# Patient Record
Sex: MALE | Employment: UNEMPLOYED | URBAN - METROPOLITAN AREA
[De-identification: names, ages, dates, MRNs, and addresses within clinical notes are randomized per-mention and may not be internally consistent; named-entity substitution may affect disease eponyms.]

---

## 2018-08-01 DIAGNOSIS — R06.02 SOB (SHORTNESS OF BREATH): Primary | ICD-10-CM

## 2018-08-31 ENCOUNTER — TELEPHONE (OUTPATIENT)
Dept: PULMONOLOGY | Facility: CLINIC | Age: 28
End: 2018-08-31

## 2018-08-31 DIAGNOSIS — R06.02 SOB (SHORTNESS OF BREATH): Primary | ICD-10-CM

## 2025-03-06 ENCOUNTER — PATIENT MESSAGE (OUTPATIENT)
Dept: SURGICAL ONCOLOGY | Facility: CLINIC | Age: 35
End: 2025-03-06
Payer: MEDICARE

## 2025-03-06 ENCOUNTER — DOCUMENTATION ONLY (OUTPATIENT)
Dept: GASTROENTEROLOGY | Facility: CLINIC | Age: 35
End: 2025-03-06

## 2025-03-06 DIAGNOSIS — R10.84 GENERALIZED ABDOMINAL PAIN: Primary | ICD-10-CM

## 2025-03-06 DIAGNOSIS — C78.6 PERITONEAL CARCINOMATOSIS: ICD-10-CM

## 2025-03-06 NOTE — PROGRESS NOTES
"Problem List Items Addressed This Visit       Generalized abdominal pain - Primary    Current Assessment & Plan   Patient had an appointment with me today but has insurance issues. He has managed medicare that our group is not contracted with. I reviewed his chart and noticed an abnormal CT scan showing "peritoneal carcinomatosis". He has seen a surgeon who referred him to [facility] GI department but patient's mother made an appointment with us.     I explained to him patient and the family that he needs to follow up with recommendations, and I contacted Dr. Nielsen's office to make him aware.     I also made a referral to our surgical oncologists in case he has problems seeing a physician.     Wil Gomez                "

## 2025-03-06 NOTE — ASSESSMENT & PLAN NOTE
"Patient had an appointment with me today but has insurance issues. He has managed medicare that our group is not contracted with. I reviewed his chart and noticed an abnormal CT scan showing "peritoneal carcinomatosis". He has seen a surgeon who referred him to [facility] GI department but patient's mother made an appointment with us.     I explained to him patient and the family that he needs to follow up with recommendations, and I contacted Dr. Nielsen's office to make him aware.     I also made a referral to our surgical oncologists in case he has problems seeing a physician.     Wil Gomez    "

## 2025-03-11 ENCOUNTER — TELEPHONE (OUTPATIENT)
Dept: SURGICAL ONCOLOGY | Facility: CLINIC | Age: 35
End: 2025-03-11
Payer: MEDICARE

## 2025-03-11 NOTE — TELEPHONE ENCOUNTER
Called pts mother to conf appt. Let her know we are out of network. Gave her my office number and told her to come in to the appt early to speak with registration/financial. Mom verbalized understanding of all things discussed and will call if this appt changes.

## 2025-03-11 NOTE — TELEPHONE ENCOUNTER
Lm on  for pt to call back to conf appt tomorrow. Gave time date and location of appt. Gave good callback number

## 2025-04-01 ENCOUNTER — TELEPHONE (OUTPATIENT)
Dept: SURGICAL ONCOLOGY | Facility: CLINIC | Age: 35
End: 2025-04-01
Payer: MEDICARE

## 2025-04-01 NOTE — TELEPHONE ENCOUNTER
Pt called to say she cannot get her paperwork to fax to the fax#. She is going to try to send it via portal

## 2025-04-02 ENCOUNTER — OFFICE VISIT (OUTPATIENT)
Dept: SURGICAL ONCOLOGY | Facility: CLINIC | Age: 35
End: 2025-04-02
Payer: MEDICARE

## 2025-04-02 VITALS
WEIGHT: 180.69 LBS | HEART RATE: 77 BPM | SYSTOLIC BLOOD PRESSURE: 107 MMHG | TEMPERATURE: 98 F | BODY MASS INDEX: 30.85 KG/M2 | HEIGHT: 64 IN | DIASTOLIC BLOOD PRESSURE: 73 MMHG

## 2025-04-02 DIAGNOSIS — E27.40 ADRENAL INSUFFICIENCY: ICD-10-CM

## 2025-04-02 DIAGNOSIS — E03.9 HYPOTHYROIDISM, UNSPECIFIED TYPE: ICD-10-CM

## 2025-04-02 DIAGNOSIS — C78.6 PERITONEAL CARCINOMATOSIS: Primary | ICD-10-CM

## 2025-04-02 DIAGNOSIS — J38.00 VOCAL CORD PARALYSIS: ICD-10-CM

## 2025-04-02 DIAGNOSIS — I10 HYPERTENSION, UNSPECIFIED TYPE: ICD-10-CM

## 2025-04-02 DIAGNOSIS — G47.33 OSA (OBSTRUCTIVE SLEEP APNEA): ICD-10-CM

## 2025-04-02 DIAGNOSIS — J69.0 RECURRENT ASPIRATION PNEUMONIA: ICD-10-CM

## 2025-04-02 PROCEDURE — 1159F MED LIST DOCD IN RCRD: CPT | Mod: CPTII,S$GLB,, | Performed by: SURGERY

## 2025-04-02 PROCEDURE — 99205 OFFICE O/P NEW HI 60 MIN: CPT | Mod: S$GLB,,, | Performed by: SURGERY

## 2025-04-02 PROCEDURE — 3008F BODY MASS INDEX DOCD: CPT | Mod: CPTII,S$GLB,, | Performed by: SURGERY

## 2025-04-02 PROCEDURE — 99999 PR PBB SHADOW E&M-EST. PATIENT-LVL III: CPT | Mod: PBBFAC,,, | Performed by: SURGERY

## 2025-04-02 PROCEDURE — 3078F DIAST BP <80 MM HG: CPT | Mod: CPTII,S$GLB,, | Performed by: SURGERY

## 2025-04-02 PROCEDURE — 3074F SYST BP LT 130 MM HG: CPT | Mod: CPTII,S$GLB,, | Performed by: SURGERY

## 2025-04-02 RX ORDER — FLUTICASONE FUROATE, UMECLIDINIUM BROMIDE AND VILANTEROL TRIFENATATE 100; 62.5; 25 UG/1; UG/1; UG/1
POWDER RESPIRATORY (INHALATION)
COMMUNITY
Start: 2025-03-08

## 2025-04-02 RX ORDER — PHENAZOPYRIDINE HYDROCHLORIDE 200 MG/1
200 TABLET, FILM COATED ORAL
COMMUNITY
Start: 2025-02-03

## 2025-04-02 RX ORDER — BUTALB/ACETAMINOPHEN/CAFFEINE 50-325-40
1 TABLET ORAL 2 TIMES DAILY
COMMUNITY

## 2025-04-02 RX ORDER — FUROSEMIDE 20 MG/1
20 TABLET ORAL
COMMUNITY

## 2025-04-02 RX ORDER — FLUTICASONE PROPIONATE 50 MCG
1 SPRAY, SUSPENSION (ML) NASAL DAILY
COMMUNITY

## 2025-04-02 RX ORDER — MULTIVITAMIN
1 TABLET ORAL EVERY MORNING
COMMUNITY

## 2025-04-02 RX ORDER — HYDROCORTISONE 10 MG/1
10 TABLET ORAL
COMMUNITY
Start: 2024-06-04

## 2025-04-02 NOTE — PROGRESS NOTES
Surgical Oncology Clinic Note      Referring Provider: Dr. Wil Gomez   PCP: Yury Castillo MD  Medical Oncologist:  Dr. Francesca Rodriguez  Urologist:  Dr. Jaya Smith  Pulm:  Dr. Benjamin Kendrick      Reason For Visit: concern for peritoneal carcinomatosis- unknown origin     HISTORY OF PRESENT ILLNESS     Brandon Lane is a 34 y.o. male with a complicated PMH who presents today for evaluation and management of suspected peritoneal carcinomatosis of unknown origin.      History is predominantly obtained from his mother and father as history from the patient was limited by cognitive impairment and speech impairment.   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  This involved his 4th ventricle and did involve near total tumor resection with craniotomy.  He was then treated at [facility] with [id number] protocol therapy including consolidation with myeloablative chemotherapy followed by autologous hematopoietic cell transplant 10/3/1997 to 6/10/1998 this involved cisplatin, cyclophosphamide, topotecan, and vincristine.  He then received craniospinal (3600 cGy), posterior fossa boost (2340 cGy, 5940 cGy total cumulative dose) radiation therapy 11/17/1997 to 1/7/1998.  All of these modalities unfortunately resulted in vocal paralysis, hearing loss, and adrenal insufficiency.  He chronically suffers from aspiration pneumonia and is on steroids and other hormone replacements for his adrenal insufficiency.  On top of all of that he developed a pulmonary embolus in 2021 this was in the setting of COVID and hypoxic respiratory failure/sepsis from aspiration pneumonia.  He also was diagnosed with a acute decompensated heart failure secondary to his COVID infection that time and was started on Lasix.  It sounds like he has overall recovered relatively well from that however does still require oxygen periodically.    Per  his family's report he started having hematuria few years ago and has been following with Urology.  He had a CT urogram done back in November at [facility] which did show some subtle infiltration of the mesenteric/omental fat which was believed either be a normal variant versus low-grade peritonitis versus peritoneal carcinomatosis.  According to his mother it sounds like they wanted to get him scheduled for cystoscopy with anesthesia however due to his pulmonary status and his other medical comorbidities that did not seem feasible.  In the meantime he has had persistent frequency and hematuria and was referred back to urology by his PCP in February of this past year.  Repeat imaging ordered by his PCP in February showed findings suspicious for peritoneal carcinomatosis without any evidence of a primary tumor however.  On February 27th of this year he and his family were in [place] visiting his sister's when he developed severe abdominal pain and vomiting.  They went to [facility] while in [place] and a CT scan was done there which they report showed concerns for omental thickening and peritoneal carcinomatosis.  He was then seen by Dr. Rae with general surgery at [facility] on 03/04/2025 who ordered an IR guided biopsy of his omentum and recommended he follow up with GI for colonoscopy since he has never had one before.  Biopsy was done on 03/21/2025 and showed atypical mesothelial proliferation.  He subsequently had a PET-CT scan on 03/28/2025 which showed no abnormal radiotracer uptake involving the fatty stranding of the omentum.  It did show a small amount of pelvic ascites with a low level elevated metabolism which was nonspecific however.  He followed up with his medical oncologist afterwards who agreed with him pursuing a 2nd opinion at [facility].    Aside from this he does note a longstanding history of back pain for the last few years.  His mother also believes that he has likely been having abdominal  pain for quite some time.  When probed about this further the patient does admit to having abdominal pain radiating up into his chest which he believes happens about once a day.  In addition he does have a longstanding history of intermittent constipation and diarrhea.  They also note some increasing abdominal girth for the last year as well as weight gain.    Past Medical History:   Diagnosis Date    ADHD     Adrenal insufficiency     HTN (hypertension)     Hypopituitarism     Hypothyroidism, unspecified     Medulloblastoma, childhood     4th ventricle- surgery at [facility] 1997, chemo + XRT at [facility]    Obesity, unspecified     LIVIER (obstructive sleep apnea)     Pulmonary embolus 2022    Recurrent aspiration pneumonia     Unspecified sensorineural hearing loss     Vocal cord paralysis        Past Surgical History:   Procedure Laterality Date    BRAIN SURGERY  1997    4th ventricle medulloblastoma removed at [facility]       No family history on file.    Social History[1]       Medication List            Accurate as of April 2, 2025 11:59 PM. If you have any questions, ask your nurse or doctor.                CONTINUE taking these medications      calcium citrate-vitamin D3 315-200 mg 315 mg-5 mcg (200 unit) per tablet  Commonly known as: CITRACAL+D     fluticasone propionate 50 mcg/actuation nasal spray  Commonly known as: FLONASE     furosemide 20 MG tablet  Commonly known as: LASIX     hydrocortisone 10 MG Tab  Commonly known as: CORTEF     levothyroxine 12.5 mcg  Commonly known as: SYNTHROID     multivitamin with folic acid 400 mcg Tab     phenazopyridine 200 MG tablet  Commonly known as: PYRIDIUM     TRELEGY ELLIPTA 100-62.5-25 mcg Dsdv  Generic drug: fluticasone-umeclidin-vilanter              Review of patient's allergies indicates:   Allergen Reactions    Penicillins      Other Reaction(s): Unknown    Onset: 01/23/2004      Other Reaction(s): Unknown    Vancomycin analogues Other (See Comments)     Other reaction(s):  Not Indicated    Other Reaction(s): Unknown      Onset: 03/05/1998      Other reaction(s): Not Indicated    Cefuroxime Other (See Comments)     Other reaction(s): Not Indicated    Onset: 10/20/2003  - Mom reported allergy to Ceftin and PCN.  Dr. Goldman indicated to label chart as CONTRAINDICATED to both.      Other reaction(s): Not Indicated       Review of Systems   Constitutional:  Negative for chills, fever, malaise/fatigue and weight loss.   HENT:  Positive for hearing loss.    Eyes:  Positive for blurred vision.   Respiratory:  Positive for cough and shortness of breath.    Cardiovascular:  Positive for leg swelling. Negative for chest pain, palpitations and orthopnea.   Gastrointestinal:  Positive for abdominal pain. Negative for blood in stool, constipation, diarrhea, nausea and vomiting.   Genitourinary:  Positive for hematuria and urgency. Negative for frequency.   Musculoskeletal:  Positive for back pain.   Skin: Negative.  Negative for rash.   Neurological:  Positive for speech change and focal weakness. Negative for dizziness.   Psychiatric/Behavioral:  Negative for depression. The patient is not nervous/anxious.          PHYSICAL EXAM     Vitals:    04/02/25 1417   BP: 107/73   Pulse: 77   Temp: 98.4 °F (36.9 °C)     Body mass index is 31.01 kg/m².  ECOG SCORE    1 - Restricted in strenuous activity-ambulatory and able to carry out work of a light nature, 2 - Capable of all selfcare but unable to carry out any work activities, active > 50% of hours         Physical Exam  Constitutional:       General: He is not in acute distress.     Appearance: Normal appearance. He is not ill-appearing.   HENT:      Head: Normocephalic and atraumatic.      Mouth/Throat:      Mouth: Mucous membranes are moist.   Eyes:      Conjunctiva/sclera: Conjunctivae normal.   Cardiovascular:      Rate and Rhythm: Normal rate and regular rhythm.   Pulmonary:      Effort: Pulmonary effort is normal. No respiratory distress.  "  Abdominal:      Palpations: Abdomen is soft. There is no mass.      Tenderness: There is no abdominal tenderness.   Musculoskeletal:         General: Normal range of motion.   Skin:     General: Skin is warm and dry.   Neurological:      Mental Status: He is alert. Mental status is at baseline.      Cranial Nerves: Cranial nerve deficit present.      Sensory: Sensory deficit present.      Coordination: Coordination abnormal.      Gait: Gait abnormal.   Psychiatric:         Mood and Affect: Mood normal.         Behavior: Behavior normal.          DATA REVIEW:  I personally reviewed the following records for this visit: history from someone besides the patient, lab work from prior visit, notes from other physicians, magnetic resonance/MR imaging, computed tomography/CT imaging, surgical pathology results, radiographic study evaluation, and laboratory results done by primary care physician    LABS       Lab Results   Component Value Date    WBC 6.1 04/20/2022    HGB 13.2 04/20/2022    HCT 42.9 04/20/2022     03/21/2025     Lab Results   Component Value Date    CALCIUM 8.2 (L) 11/29/2021    ALBUMIN 3.8 11/29/2021     11/29/2021    K 4.6 11/29/2021    CO2 33 11/29/2021     11/29/2021    BUN 10 11/29/2021    CREATININE 0.73 11/29/2021       Nutritional:  No results found for: "PREALBUMIN"    Tumor Markers:  No results found for: "CEA", "AMYLASE", "ASPIRATE", "ZXY524", "", "MM4080", "AFP", "AFPTM"  No results found for: ""    PATHOLOGY     03/21/2025:  IR Guided biopsy   SEE DETAILS   Clinical Data: Generalized abdominal pain; abnormal CT scan                FINAL PATHOLOGIC DIAGNOSIS     Right omental tissue, biopsy:                                            - Atypical mesothelial proliferation.                                    Comment; Immunostains show that the lesional cells are positive for     calretinin, cytokeratin A, and D2-40 and are negative for Mike-EP4 and   ERG. This " immunoprofile is consistent with mesothelial cells. A         neoplastic mesothelial proliferation is a consideration, correlate      with radiographic findings. A reactive process to an unsampled          carcinoma is also in the differential.         IMAGING     11/07/2024:  CT Abdomen Pelvis WWO contrast   Subtle infiltration of the mesenteric/omental fat, with a small   amount of pelvic ascites.  Possible normal variant versus low-grade peritonitis   versus peritoneal carcinomatosis.  Follow-up suggested.   Multiple hepatic cysts.  Multiple renal cysts.   Otherwise negative.     02/21/2025:  CT Abdomen Pelvis W IV Contrast  The findings are suspicious for peritoneal carcinomatosis. No primary tumor is identified. Consider a biopsy of the greater omentum.     03/20/2025:  MRI Abdomen/ Pelvis  Omental caking suspicious for peritoneal metastatic disease. The primary tumor site is uncertain.     03/28/2025:  PET CT Scan   1. No abnormal radiotracer uptake involving fatty stranding of the omentum.   2. Focal hypermetabolism in the midline inferior prostate gland is nonspecific and may be related to an inflammatory versus neoplastic process.   3. Small amount of pelvic ascites with low-level elevated metabolism is nonspecific and may be related to an inflammatory process. A neoplastic process cannot be excluded     ASSESSMENT & PLAN     1. Peritoneal carcinomatosis    2. Adrenal insufficiency    3. Hypothyroidism, unspecified type    4. LIVIER (obstructive sleep apnea)    5. Recurrent aspiration pneumonia    6. Vocal cord paralysis    7. Hypertension, unspecified type       Brandon Lane is a 34 y.o. male with a complex medical history who presents for evaluation of possible peritoneal carcinomatosis of unknown origin.    I had a lengthy discussion with the patient and his parents regarding the differential diagnosis for peritoneal disease.  I reviewed his imaging with them which does show a significant amount of fat  stranding and infiltration of what looks like the omentum.  Differential for this certainly includes things such as mesothelioma, urothelial carcinoma, metastatic disease from primary upper GI source or a lower GI source (although seem less likely), lymphoma, or Castleman's disease (although again less likely).  We also discussed that it is possible that this represents an autoimmune process such as an IgG 4 disease or sclerosing mesenteritis (again less likely).  I emphasized to them that treatment of this really depends on the pathologic diagnosis.  We did review that it can be somewhat challenging to obtain a pathologic diagnosis based on core needle biopsy alone due to the limited amount of specimen and the possibility of sampling error.  In a perfect world I think he would benefit from a diagnostic laparoscopy with an incisional biopsy of the omentum and evaluation of his peritoneum and any other necessary workup.  Unfortunately based on his parents report as well as the fact that he is yet to have a cystoscopy due to his inability to undergo general anesthesia this seems very unlikely and I certainly think would carry an unnecessary amount of risk when we have not completely exhausted all other options at this point.  However if we go that route I do think he would benefit from having the diagnostic laparoscopy, EGD, and colonoscopy all done during the same anesthesia.  I discussed with them that I will plan to present him at our multidisciplinary tumor board on Friday.  I will also work on obtaining his pathology slides from the outside hospital in order to have them reviewed here.  I would also recommend he undergo repeat IR guided biopsy with the additional samples obtained in order to see if we can get additional information from this.  I do think that he would benefit from undergoing a cystoscopy as the hematuria is certainly concerning for urothelial carcinoma or urogenital primary.  Any additional  treatment or pathways we will be decided based on his pathology and our 1st goal should be to obtain an appropriate tissue diagnosis.      Plan:   -- present at multi-disciplinary tumor board on Friday   -- obtain pathology from outside hospital for review here  -- repeat IR biopsy for now- while working up possibility of general anesthesia for diagnostic laparoscopy  -- recommended they proceed with having cystoscopy done on April 8th with his urologist    Mr. Lane and his family expressed understanding in regards to our discussion today. Many good questions were asked on today's visit, all of which were answered to their satisfaction.    Follow-up: Follow up in about 2 weeks (around 4/16/2025).                  Yamile Oro MD, MS, SO  Board Certified Surgical Oncologist   [facility] [place], [place]  Office: [phone]       Communications: 75 minutes were spent on today's visit in face-to-face and non face-to-face time with the patient. This patient was recently diagnosed with peritoneal disease of unknown primary and the time was required to provide counseling and guidance regarding their new diagnosis. Additional time was spent reviewing all outside records and information pertaining to their work-up and formulating a treatment plan in line with standardized guidelines. Additional time was spent communicating with referring physicians and facilities to facilitate the efficient exchange of previous healthcare records and radiographic imaging pertinent to the diagnosis and disease management.       No orders of the defined types were placed in this encounter.            [1]   Social History  Socioeconomic History    Marital status: Single   Tobacco Use    Smoking status: Never    Smokeless tobacco: Never   Substance and Sexual Activity    Alcohol use: Never    Drug use: Never    Sexual activity: Never

## 2025-04-02 NOTE — LETTER
[facility] - Surgical Oncology  [address] [place] [address] [address]  [place] [address]  Phone: [phone]  Fax: [phone] April 3, 2025     Francesca Rodriguez MD  [id number]  [place] [address]    Patient: Brandon Lane   YOB: 1990   Date of Visit: 4/2/2025       Dear Dr. Rodriguez:    I saw Mr. Lane today in clinic. Attached you will find a copy of my note.    As you know, he is a 34 y.o. male who presented with concern for peritoneal carcinomatosis of unknown origin. I was able to discuss with them the differential diagnosis of this and workup. The current plan includes repeat IR biopsy, re-evaluation by pathology while discussing with pulmonology and cardiology the possibility of general anesthesia for diagnostic laparoscopy if the cystoscopy he has planned on April 8th doesn't reveal anything.      If you have any questions or concerns, please don't hesitate to contact my office.     Sincerely,        Yamile Oro MD MS, FSSO  Board Certified Surgical Oncologist       CC: Francesca Rodriguez MD  [id number]  [place] [address]  Via Fax: [phone]    Yury Castillo MD  [address] [place] [address]  Via Fax: [phone]

## 2025-04-03 ENCOUNTER — PATIENT MESSAGE (OUTPATIENT)
Dept: SURGICAL ONCOLOGY | Facility: CLINIC | Age: 35
End: 2025-04-03
Payer: MEDICARE

## 2025-04-03 NOTE — NURSING
Nurse Navigator Note:     Met with patient during her consult with Dr. Oro.  Discussed with patient and his parents that we are OON & they will be self pay. They understand this. Patient and I reviewed the information he discussed with Dr. Oro, including treatment options, referrals, diagnosis, and future plans for workup. Discussed the role of the nurse navigator and how I can help him through his cancer journey. Spoke with Harriet at [facility] who is sending imaging over via [facility] [employer]. Fax request sent to [facility] to send tissue sample for our pathologist to review.    Patient was given a copy of his appointments, Dr. Oro's card, and my card. Encouraged him to call me if he has any questions or concerns or would like to schedule any additional appointments. Patient verbalized understanding of all information.        Oncology Navigation   Intake  Date of Diagnosis: 03/21/25  Type of Referral: Internal  Date of Referral: 03/06/25  Initial Nurse Navigator Contact: 03/06/25  Referral to Initial Contact Timeline (days): 0  First Appointment Available: 04/02/25  Appointment Date: 04/02/25  First Available Date vs. Scheduled Date (days): 0     Treatment  Current Status: Active  Date Presented to Tumor Board: 04/04/25                         Support Systems: Parent  Barriers of Care: Financial concerns  Financial Concerns: Other (OON)     Acuity      Follow Up  No follow-ups on file.

## 2025-04-04 ENCOUNTER — TUMOR BOARD CONFERENCE (OUTPATIENT)
Dept: HEMATOLOGY/ONCOLOGY | Facility: CLINIC | Age: 35
End: 2025-04-04
Payer: MEDICARE

## 2025-04-04 ENCOUNTER — PATIENT MESSAGE (OUTPATIENT)
Dept: SURGICAL ONCOLOGY | Facility: CLINIC | Age: 35
End: 2025-04-04
Payer: MEDICARE

## 2025-04-04 NOTE — PROGRESS NOTES
Tumor Board Documentation      Brandon Lane was presented by Yamile Oro MD at our Tumor Board on 4/4/2025, which included representatives from Medical Oncology, Hematology, Radiation Oncology, Surgical Oncology, Pathology, Navigation, Genetics, Radiology, Gastrointestinal, Pulmonology, Urology.    Brandon currently presents as a current patient with Unknown primary, with history of the following treatments: None.    Additionally, we reviewed previous medical and familial history, history of present illness, and recent lab results along with all available histopathologic and imaging studies. The tumor board considered available treatment options and made the following recommendations:  Biopsy, Additional screening         Get path slides for review here Repeat IR biopsy.Get cystoscope. If nothing from all of that will plan for diagnostic Iaparoscopy +/ - EGD and colonoscopy     The following procedures/referrals were also placed: No orders of the defined types were placed in this encounter.      Clinical Trial Status: Not discussed     National site-specific guidelines were discussed with respect to the case.    Tumor board is a meeting of clinicians from various specialty areas who evaluate and discuss patients for whom a multidisciplinary approach is being considered. Final determinations in the plan of care are those of the provider(s). The responsibility for follow up of recommendations given during tumor board is that of the provider.     Yamile Oro MD

## 2025-04-07 DIAGNOSIS — K66.9 PERITONEAL DISEASE: Primary | ICD-10-CM

## 2025-04-11 ENCOUNTER — TELEPHONE (OUTPATIENT)
Dept: RADIOLOGY | Facility: HOSPITAL | Age: 35
End: 2025-04-11
Payer: MEDICARE

## 2025-04-11 DIAGNOSIS — K66.9 PERITONEAL DISEASE: Primary | ICD-10-CM

## 2025-04-11 PROCEDURE — 88342 IMHCHEM/IMCYTCHM 1ST ANTB: CPT | Mod: TC | Performed by: SURGERY

## 2025-04-11 PROCEDURE — 88321 CONSLTJ&REPRT SLD PREP ELSWR: CPT | Mod: ,,, | Performed by: PATHOLOGY

## 2025-04-11 NOTE — TELEPHONE ENCOUNTER
Interventional Radiology  Scheduled 9:30AM omental mass biopsy for 4/24/25 w/patient's mother, Leslie.  Instructed that pt. should arrive by 8:00AM to the hospital ([address]/ Entr[address]) off [address] in [place] and check in at Patient Registration located on the first floor.  Informed that pt. must have a ride and NPO after midnight the night before.  Pt. is not taking ASA or other blood thinners, NSAIDS, fish oil, or GLP-1/GIP agonists. Pt. can take morning medications the day of the procedure with a small sip of water.  I gave Leslie our direct number [phone] and she verbalized understanding of all instructions.

## 2025-04-14 ENCOUNTER — PATIENT MESSAGE (OUTPATIENT)
Dept: SURGICAL ONCOLOGY | Facility: CLINIC | Age: 35
End: 2025-04-14
Payer: MEDICARE

## 2025-04-14 ENCOUNTER — TELEPHONE (OUTPATIENT)
Dept: SURGICAL ONCOLOGY | Facility: CLINIC | Age: 35
End: 2025-04-14
Payer: MEDICARE

## 2025-04-14 DIAGNOSIS — C78.6 PERITONEAL CARCINOMATOSIS: Primary | ICD-10-CM

## 2025-04-14 DIAGNOSIS — R18.0 MALIGNANT ASCITES: ICD-10-CM

## 2025-04-14 DIAGNOSIS — K66.9 PERITONEAL DISEASE: Primary | ICD-10-CM

## 2025-04-14 NOTE — TELEPHONE ENCOUNTER
Called to conf appt. Mom asked to move appt to after imaging. Rescheduled appt. Gave time date and location of this appt to mom. She verbalized understanding of all things discussed

## 2025-04-15 ENCOUNTER — TELEPHONE (OUTPATIENT)
Dept: SURGICAL ONCOLOGY | Facility: CLINIC | Age: 35
End: 2025-04-15
Payer: MEDICARE

## 2025-04-15 NOTE — TELEPHONE ENCOUNTER
Spoke with patients mother, Leslie. Checking on status of external labs for Bx. She states she will call this morning and let me know. Verbalized understanding.

## 2025-04-17 ENCOUNTER — TELEPHONE (OUTPATIENT)
Dept: SURGICAL ONCOLOGY | Facility: CLINIC | Age: 35
End: 2025-04-17
Payer: MEDICARE

## 2025-04-28 ENCOUNTER — TELEPHONE (OUTPATIENT)
Dept: SURGICAL ONCOLOGY | Facility: CLINIC | Age: 35
End: 2025-04-28
Payer: MEDICARE

## 2025-04-28 ENCOUNTER — TELEPHONE (OUTPATIENT)
Dept: SURGERY | Facility: CLINIC | Age: 35
End: 2025-04-28
Payer: MEDICARE

## 2025-04-28 NOTE — TELEPHONE ENCOUNTER
Spoke with pt regarding rescheduling visit with Dr. Oro out for another week due to path results have not reported; pt verbally responded in the affirmative and agreed to rescheduled visit on Wednesday, 5/7 @ 4:30p; pt did not have any questions or concerns prior to call ending.

## 2025-04-28 NOTE — TELEPHONE ENCOUNTER
Called patients mother back. Discussed that outside path is still pending & that the CT with Dr. Kennedy should be discussed with him.     ----- Message from Ching sent at 4/28/2025  3:30 PM CDT -----  Contact: Sabrina / Mother  Type:  Needs Medical AdviceWho Called: ImmaWould the patient rather a call back or a response via [facility]? Call [facility] Call Back Number: [phone] Information: Leslie need to know if the patient need to keep tomorrow appointment with Dr. Kennedy? If not they will need to reschedule.

## 2025-05-13 ENCOUNTER — TELEPHONE (OUTPATIENT)
Dept: SURGICAL ONCOLOGY | Facility: CLINIC | Age: 35
End: 2025-05-13
Payer: MEDICARE

## 2025-05-13 NOTE — TELEPHONE ENCOUNTER
Called pt. Spoke with Mother. They are having surgery with another provider @ [facility]. They canceled appts with Dr. Oro and will call back should they need anything else in the future.

## 2025-05-20 ENCOUNTER — PATIENT MESSAGE (OUTPATIENT)
Dept: SURGICAL ONCOLOGY | Facility: CLINIC | Age: 35
End: 2025-05-20
Payer: MEDICARE

## 2025-05-21 ENCOUNTER — TELEPHONE (OUTPATIENT)
Dept: SURGICAL ONCOLOGY | Facility: CLINIC | Age: 35
End: 2025-05-21
Payer: MEDICARE

## 2025-05-21 NOTE — TELEPHONE ENCOUNTER
Called pt's POA to get signature to release records for Dr. Kennedy @ [facility]. POA is sleeping (mother, Leslie). Father is coming to  form for her to sign.

## 2025-06-02 ENCOUNTER — TELEPHONE (OUTPATIENT)
Dept: SURGICAL ONCOLOGY | Facility: CLINIC | Age: 35
End: 2025-06-02
Payer: MEDICARE

## 2025-06-04 ENCOUNTER — TELEPHONE (OUTPATIENT)
Dept: SURGICAL ONCOLOGY | Facility: CLINIC | Age: 35
End: 2025-06-04
Payer: MEDICARE

## 2025-06-09 ENCOUNTER — TELEPHONE (OUTPATIENT)
Dept: SURGICAL ONCOLOGY | Facility: CLINIC | Age: 35
End: 2025-06-09
Payer: MEDICARE

## 2025-06-09 NOTE — TELEPHONE ENCOUNTER
Called father to move appt per provider being called out for an emerg. Father gurinder's us of all things discussed and agreed to new appt

## 2025-06-24 ENCOUNTER — TELEPHONE (OUTPATIENT)
Dept: SURGICAL ONCOLOGY | Facility: CLINIC | Age: 35
End: 2025-06-24
Payer: MEDICARE

## 2025-06-24 NOTE — TELEPHONE ENCOUNTER
Called pt's father to let him know we have what we need for this appt after speaking with Dr. Oro. Pt's father verbalized understanding of all things discussed

## 2025-06-25 ENCOUNTER — OFFICE VISIT (OUTPATIENT)
Dept: SURGICAL ONCOLOGY | Facility: CLINIC | Age: 35
End: 2025-06-25
Payer: MEDICARE

## 2025-06-25 VITALS
TEMPERATURE: 99 F | WEIGHT: 180 LBS | HEIGHT: 64 IN | HEART RATE: 58 BPM | DIASTOLIC BLOOD PRESSURE: 72 MMHG | SYSTOLIC BLOOD PRESSURE: 105 MMHG | BODY MASS INDEX: 30.73 KG/M2

## 2025-06-25 DIAGNOSIS — C45.1 PERITONEAL MESOTHELIOMA: Primary | ICD-10-CM

## 2025-06-25 DIAGNOSIS — E27.40 ADRENAL INSUFFICIENCY: ICD-10-CM

## 2025-06-25 DIAGNOSIS — C78.6 PERITONEAL CARCINOMATOSIS: ICD-10-CM

## 2025-06-25 DIAGNOSIS — J69.0 RECURRENT ASPIRATION PNEUMONIA: ICD-10-CM

## 2025-06-25 PROCEDURE — 1159F MED LIST DOCD IN RCRD: CPT | Mod: CPTII,S$GLB,, | Performed by: SURGERY

## 2025-06-25 PROCEDURE — 99214 OFFICE O/P EST MOD 30 MIN: CPT | Mod: S$GLB,,, | Performed by: SURGERY

## 2025-06-25 PROCEDURE — 3078F DIAST BP <80 MM HG: CPT | Mod: CPTII,S$GLB,, | Performed by: SURGERY

## 2025-06-25 PROCEDURE — 99999 PR PBB SHADOW E&M-EST. PATIENT-LVL III: CPT | Mod: PBBFAC,,, | Performed by: SURGERY

## 2025-06-25 PROCEDURE — 3074F SYST BP LT 130 MM HG: CPT | Mod: CPTII,S$GLB,, | Performed by: SURGERY

## 2025-06-25 PROCEDURE — 3008F BODY MASS INDEX DOCD: CPT | Mod: CPTII,S$GLB,, | Performed by: SURGERY

## 2025-06-25 NOTE — PROGRESS NOTES
Surgical Oncology Clinic Note      Referring Provider: Dr. Wil Gomez   PCP: Yury Castillo MD  Medical Oncologist:  Dr. Francesca Rodriguez  Urologist:  Dr. Jaya Smith  Pulm:  Dr. Benjamin Kendrick    Reason For Visit: Peritoneal mesothelioma- epithelioid type     HISTORY OF PRESENT ILLNESS     Brandon Lane is a 35 y.o. male with a complicated PMH who presents today for evaluation and management of suspected peritoneal carcinomatosis of unknown origin.      History is predominantly obtained from his mother and father as history from the patient was limited by cognitive impairment and speech impairment.   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  This involved his 4th ventricle and did involve near total tumor resection with craniotomy.  He was then treated at [facility] with [id number] protocol therapy including consolidation with myeloablative chemotherapy followed by autologous hematopoietic cell transplant 10/3/1997 to 6/10/1998 this involved cisplatin, cyclophosphamide, topotecan, and vincristine.  He then received craniospinal (3600 cGy), posterior fossa boost (2340 cGy, 5940 cGy total cumulative dose) radiation therapy 11/17/1997 to 1/7/1998.  All of these modalities unfortunately resulted in vocal paralysis, hearing loss, and adrenal insufficiency.  He chronically suffers from aspiration pneumonia and is on steroids and other hormone replacements for his adrenal insufficiency.  On top of all of that he developed a pulmonary embolus in 2021 this was in the setting of COVID and hypoxic respiratory failure/sepsis from aspiration pneumonia.  He also was diagnosed with a acute decompensated heart failure secondary to his COVID infection that time and was started on Lasix.  It sounds like he has overall recovered relatively well from that however does still require oxygen periodically.    Per his family's  report he started having hematuria few years ago and has been following with Urology.  He had a CT urogram done back in November at [facility] which did show some subtle infiltration of the mesenteric/omental fat which was believed either be a normal variant versus low-grade peritonitis versus peritoneal carcinomatosis.  According to his mother it sounds like they wanted to get him scheduled for cystoscopy with anesthesia however due to his pulmonary status and his other medical comorbidities that did not seem feasible.  In the meantime he has had persistent frequency and hematuria and was referred back to urology by his PCP in February of this past year.  Repeat imaging ordered by his PCP in February showed findings suspicious for peritoneal carcinomatosis without any evidence of a primary tumor however.  On February 27th of this year he and his family were in [place] visiting his sister's when he developed severe abdominal pain and vomiting.  They went to [facility] while in [place] and a CT scan was done there which they report showed concerns for omental thickening and peritoneal carcinomatosis.  He was then seen by Dr. Rae with general surgery at [facility] on 03/04/2025 who ordered an IR guided biopsy of his omentum and recommended he follow up with GI for colonoscopy since he has never had one before.  Biopsy was done on 03/21/2025 and showed atypical mesothelial proliferation.  He subsequently had a PET-CT scan on 03/28/2025 which showed no abnormal radiotracer uptake involving the fatty stranding of the omentum.  It did show a small amount of pelvic ascites with a low level elevated metabolism which was nonspecific however.  He followed up with his medical oncologist afterwards who agreed with him pursuing a 2nd opinion at [facility].    Aside from this he does note a longstanding history of back pain for the last few years.  His mother also believes that he has likely been having abdominal pain for quite  some time.  When probed about this further the patient does admit to having abdominal pain radiating up into his chest which he believes happens about once a day.  In addition he does have a longstanding history of intermittent constipation and diarrhea.  They also note some increasing abdominal girth for the last year as well as weight gain.    INTERVAL HISTORY     06/25/2025:  He presents for follow up.  He was seen by Dr. Kennedy at the [facility] and underwent diagnostic laparoscopy on 04/29/2025- with findings concerning for peritoneal mesothelioma.  PCI was estimated at 14.  Fortunately did get readmitted that night due to respiratory distress and was in the hospital for a few days afterwards.  Pathology returned consistent with epithelioid type peritoneal mesothelioma although it did have to be sent out to [facility] for confirmation.  Overall he has been feeling well states he has actually felt better since surgery occurred.  He was seen again by Dr. Lopez who due to his underlying comorbidities did not recommend HIPEC and PCI at that time but is planning to see him back relatively soon to see if his functional status has improved.  The family returns to me for 2nd opinion.    Past Medical History:   Diagnosis Date    ADHD     Adrenal insufficiency     HTN (hypertension)     Hypopituitarism     Hypothyroidism, unspecified     Medulloblastoma, childhood     4th ventricle- surgery at [facility] 1997, chemo + XRT at [facility]    Obesity, unspecified     LIVIER (obstructive sleep apnea)     Pulmonary embolus 2022    Recurrent aspiration pneumonia     Unspecified sensorineural hearing loss     Vocal cord paralysis        Past Surgical History:   Procedure Laterality Date    BRAIN SURGERY  1997    4th ventricle medulloblastoma removed at [facility]       No family history on file.    Social History[1]       Medication List            Accurate as of June 25, 2025  5:10 PM. If you have any questions, ask your nurse or doctor.                CONTINUE  taking these medications      calcium citrate-vitamin D3 315-200 mg 315 mg-5 mcg (200 unit) per tablet  Commonly known as: CITRACAL+D     fluticasone propionate 50 mcg/actuation nasal spray  Commonly known as: FLONASE     furosemide 20 MG tablet  Commonly known as: LASIX     hydrocortisone 10 MG Tab  Commonly known as: CORTEF     levothyroxine 12.5 mcg  Commonly known as: SYNTHROID     multivitamin with folic acid 400 mcg Tab     phenazopyridine 200 MG tablet  Commonly known as: PYRIDIUM     TRELEGY ELLIPTA 100-62.5-25 mcg Dsdv  Generic drug: fluticasone-umeclidin-vilanter              Review of patient's allergies indicates:   Allergen Reactions    Penicillins      Other Reaction(s): Unknown    Onset: 01/23/2004      Other Reaction(s): Unknown    Vancomycin analogues Other (See Comments)     Other reaction(s): Not Indicated    Other Reaction(s): Unknown      Onset: 03/05/1998      Other reaction(s): Not Indicated    Cefuroxime Other (See Comments)     Other reaction(s): Not Indicated    Onset: 10/20/2003  - Mom reported allergy to Ceftin and PCN.  Dr. Goldman indicated to label chart as CONTRAINDICATED to both.      Other reaction(s): Not Indicated       Review of Systems   Constitutional:  Negative for chills, fever, malaise/fatigue and weight loss.   HENT:  Positive for hearing loss.    Eyes:  Positive for blurred vision.   Respiratory:  Positive for cough and shortness of breath.    Cardiovascular:  Positive for leg swelling. Negative for chest pain, palpitations and orthopnea.   Gastrointestinal:  Negative for abdominal pain, blood in stool, constipation, diarrhea, nausea and vomiting.   Genitourinary:  Positive for hematuria and urgency. Negative for frequency.   Musculoskeletal:  Positive for back pain.   Skin: Negative.  Negative for rash.   Neurological:  Positive for speech change and focal weakness. Negative for dizziness.   Psychiatric/Behavioral:  Negative for depression. The patient is not  nervous/anxious.          PHYSICAL EXAM     Vitals:    06/25/25 1647   BP: 105/72   Pulse: (!) 58   Temp: 99 °F (37.2 °C)       Body mass index is 30.9 kg/m².  ECOG SCORE             Physical Exam  Constitutional:       General: He is not in acute distress.     Appearance: Normal appearance. He is not ill-appearing.   HENT:      Head: Normocephalic and atraumatic.      Mouth/Throat:      Mouth: Mucous membranes are moist.   Eyes:      Conjunctiva/sclera: Conjunctivae normal.   Cardiovascular:      Rate and Rhythm: Normal rate and regular rhythm.   Pulmonary:      Effort: Pulmonary effort is normal. No respiratory distress.   Abdominal:      Palpations: Abdomen is soft. There is no mass.      Tenderness: There is no abdominal tenderness.   Musculoskeletal:         General: Normal range of motion.   Skin:     General: Skin is warm and dry.   Neurological:      Mental Status: He is alert. Mental status is at baseline.      Cranial Nerves: Cranial nerve deficit present.      Sensory: Sensory deficit present.      Coordination: Coordination abnormal.      Gait: Gait abnormal.   Psychiatric:         Mood and Affect: Mood normal.         Behavior: Behavior normal.          DATA REVIEW:  I personally reviewed the following records for this visit: history from someone besides the patient, lab work from prior visit, notes from other physicians, magnetic resonance/MR imaging, computed tomography/CT imaging, surgical pathology results, radiographic study evaluation, and laboratory results done by primary care physician    LABS       Lab Results   Component Value Date    WBC 6.1 04/20/2022    HGB 13.2 04/20/2022    HCT 42.9 04/20/2022     03/21/2025     Lab Results   Component Value Date    CALCIUM 8.5 (L) 04/30/2025    ALBUMIN 3.8 11/29/2021     04/30/2025    K 5.6 (H) 04/30/2025    CO2 34 (H) 04/30/2025    CL 96 (L) 04/30/2025    BUN 11 04/30/2025    CREATININE 0.6 (L) 04/30/2025       Nutritional:  No results  "found for: "PREALBUMIN"    Tumor Markers:  No results found for: "CEA", "AMYLASE", "ASPIRATE", "RDG382", "", "ZC3220", "AFP", "AFPTM"  No results found for: ""    PATHOLOGY     03/21/2025:  IR Guided biopsy   SEE DETAILS   Clinical Data: Generalized abdominal pain; abnormal CT scan                FINAL PATHOLOGIC DIAGNOSIS     Right omental tissue, biopsy:                                            - Atypical mesothelial proliferation.                                    Comment; Immunostains show that the lesional cells are positive for     calretinin, cytokeratin A, and D2-40 and are negative for Mike-EP4 and   ERG. This immunoprofile is consistent with mesothelial cells. A         neoplastic mesothelial proliferation is a consideration, correlate      with radiographic findings. A reactive process to an unsampled          carcinoma is also in the differential.         04/29/2025:  DIAGNOSTIC LAPAROSCOPY  SEE DETAILS   Clinical Data: Peritoneal carcinoma                                        FINAL PATHOLOGIC DIAGNOSIS        1. Peritoneal implant, biopsy:                                             - See part 3.                                                          2. Appendix, appendectomy:                                                 - Luminal obliteration by lipomatous hypertrophy.                       - Atypical papillary mesothelial proliferation arising on the           surface of the appendix and mesoappendix.                              3. Omentum, partial omentectomy (consultation report from [facility], Case #[id number]):                                              - Mesothelioma, epithelial type, low-grade, with favorable              patterns of growth (papillary, glandular/adenomatoid).                    Note: Additional H & E-stained sections and immunoperoxidase            studies were performed on paraffin-embedded tissue of block             [id number], 3 and 4. " These show that the mesothelial cells             retained BAP1, MTAP and Merlin.                                           Slides reviewed with Dr. Hernando Bose.                               See Report Comment.                                                    4. Peritoneal implants, biopsy:                                            - See part 3.                                                          5. Peritoneal fluid, cytology (2 cytospins, 1 ThinPrep, 1 cell             block):                                                                 - Reactive mesothelial cells and mixed acute and chronic                inflammation.                                                             MF/n/ssj                                                            Case Comment:                                                           Dear Dr. Henson,                                                       Thank you for the opportunity to review the sections from this          35-year-old patient. I have also performed a comprehensive review of    the patient's medical record, including history, physical and imaging,   and incorporated this into my overall interpretation. He has a          remarkable history. He had a medulloblastoma, status post craniotomy    with chemotherapy and radiation (1997). It does appear that the         patient had a ventriculoperitoneal shunt at one point, although it is   unclear at the time of this letter that it is still in place. The       patient has also had chronic aspiration, severe COVID infection and     acute compensated congestive heart failure. During a workup, he was     found to have features suspicious for a peritoneal carcinomatosis. He   underwent an appendectomy and omentectomy with biopsies.                  I have shared this case with Dr. Hernando Bose, another member of   our group with expertise in thoracic pathology. We, like you, note the   presence of an  atypical proliferation involving the omentum.            The omentum is focally caked by a proliferation of low cuboidal cells,   which are focally forming small papillae. Beneath the omental surface   is a proliferation of cells forming adenomatoid-like lumens. Some are   round, but others are more complex and irregular. They are lined by     flattened to slightly enlarged cuboidal cells. Some of the cells do     appear to infiltrate a thickened and fibrotic omentum and dissect       between fat lobules. You had done a number of immunoperoxidase          studies, which showed that these cells were reactive with antibodies    to calretinin, WT-1, D2-40 and cytokeratin 7. They fail to react with   antibodies to Mike-EP4 or CEA. These identify them as mesothelial        cells.                                                                    The mode of presentation in this case as well as the morphology, make   it highly suspicious that these represent mesothelioma, but given the   unusual setting, we thought it best to try to get additional evidence   for this by doing tests for the molecular abnormalities we associate    with mesothelioma, i.e., BAP1 loss, Merlin loss or MTAP loss. As noted   above, these show no loss. Nonetheless, we think given the overall      morphology and invasive growth pattern of this mesothelial              proliferation, it is best diagnosed as mesothelioma.                      As I mentioned to you in our telephone conversation, we had previously   reported a group of patients in whom mesotheliomas developed            associated with ventricular peritoneal shunts (see Farzana T., et al   Am J Surg Pathol 2021; 45: 255-262). The age range in these patients    was on the young side for mesothelioma (median age 31, range 14-45      years), which would fit for this patient.                                 Thank you very much for the opportunity to review this case. If we can   be of  further help in the care of this patient, please do not hesitate   to contact us.                                                            Dk Han M.D.                                                 Consultant, Dept. of Laboratory Medicine and Pathology                  Professor of Laboratory Medicine/Pathology                              [facility] [employer]                                         [facility] Larisa Henson M.D.                                                  PGL Pathologist, Electronic Signature         IMAGING     11/07/2024:  CT Abdomen Pelvis WWO contrast   Subtle infiltration of the mesenteric/omental fat, with a small   amount of pelvic ascites.  Possible normal variant versus low-grade peritonitis   versus peritoneal carcinomatosis.  Follow-up suggested.   Multiple hepatic cysts.  Multiple renal cysts.   Otherwise negative.     02/21/2025:  CT Abdomen Pelvis W IV Contrast  The findings are suspicious for peritoneal carcinomatosis. No primary tumor is identified. Consider a biopsy of the greater omentum.     03/20/2025:  MRI Abdomen/ Pelvis  Omental caking suspicious for peritoneal metastatic disease. The primary tumor site is uncertain.     03/28/2025:  PET CT Scan   1. No abnormal radiotracer uptake involving fatty stranding of the omentum.   2. Focal hypermetabolism in the midline inferior prostate gland is nonspecific and may be related to an inflammatory versus neoplastic process.   3. Small amount of pelvic ascites with low-level elevated metabolism is nonspecific and may be related to an inflammatory process. A neoplastic process cannot be excluded     ASSESSMENT & PLAN     1. Peritoneal mesothelioma    2. Peritoneal carcinomatosis    3. Adrenal insufficiency    4. Recurrent aspiration pneumonia         Brandon Lane is a 35 y.o. male with a complex medical  history who follows up to discuss results of his diagnostic laparoscopy with Dr. Kennedy at the [facility].      I discussed with Brandon and his parents that based on the information at hand, I agree with Dr. Kennedy and his recommendations.  I reviewed with them again the different types of peritoneal mesothelioma as, specifically epithelioid type as well as their natural history and treatment.  I again reiterated that at the through a type does have the best prognosis and reviewed the [place] consensus guidelines and peritoneal malignancies with them.  They understand that the overall prognosis following treatment represents a median survival of over 15 months and a 5 year survival of more than 50% after cytoreductive surgery and HIPEC.  I did discuss with them again as Dr. Kennedy stated that cytoreductive surgery and HIPEC are the standard of care in first-line treatment for people with the epithelial type peritoneal mesothelioma especially in those who have a low PCI score, as he does.  Unfortunately the events following his diagnostic laparoscopy do raise concerns, and are the same concerns I had about even pursuing a diagnostic laparoscopy on him in the first place, which is his ability to recover and tolerate and general anesthesia for a prolonged period of time and recover from a major operation.  I will say that he did seem to recover overall quite well once he was discharged from the hospital according to his family.  Per his mother, the biggest issue was really the combination of the medicines and the decreased respiratory drive that he had due to those medicines.  The advantage of HIPEC would be that it would not be an outpatient procedure.  I discussed with them again however that I do not do the surgeries and I have not done them in quite some time and therefore can not really recommend whether or not he would be a good candidate for it especially because I would not be the 1 undertaking the risk associated with his  surgery.  They are interested in getting a 2nd opinion from someone and I think it would be very reasonable for them to meet with one of my partners in [place] to see if they thought he would be a candidate and if not what additional options would would be possible.  They are in agreement with that and I will refer him to [place] for evaluation by either Dr. Schmidt or Dr. Oviedo.    Plan:  -- referral to one of my partners in [place] who does HIPEC (either Dr. Schmidt or Dr. Oviedo) for second opinion     Mr. Lane and his family expressed understanding in regards to our discussion today. Many good questions were asked on today's visit, all of which were answered to their satisfaction.    Follow-up: as needed                  Yamile Oro MD, MS, [facility]  Board Certified Surgical Oncologist   [facility] [place], [place]  Office: [phone]       Communications: 30 minutes were spent on today's visit in face-to-face and non face-to-face time with the patient. This patient was recently diagnosed with peritoneal mesothelioma and the time was required to provide counseling and guidance regarding their new diagnosis. Additional time was spent reviewing all outside records and information pertaining to their work-up and formulating a treatment plan in line with standardized guidelines. Additional time was spent communicating with referring physicians and facilities to facilitate the efficient exchange of previous healthcare records and radiographic imaging pertinent to the diagnosis and disease management.       Orders Placed This Encounter   Procedures    Ambulatory referral/consult to Surgical Oncology     Standing Status:   Future     Expected Date:   7/2/2025     Expiration Date:   7/25/2026     Referral Priority:   Routine     Referral Type:   Consultation     Referral Reason:   Specialty Services Required     Requested Specialty:   Surgical Oncology     Number of Visits  Requested:   1               [1]   Social History  Socioeconomic History    Marital status: Single   Tobacco Use    Smoking status: Never    Smokeless tobacco: Never   Substance and Sexual Activity    Alcohol use: Never    Drug use: Never    Sexual activity: Never     Social Drivers of Health     Food Insecurity: No Food Insecurity (5/1/2025)    Received from [facility] and Its Subsidiaries and Affiliates    Hunger Vital Sign     Worried About Running Out of Food in the Last Year: Never true     Ran Out of Food in the Last Year: Never true   Transportation Needs: No Transportation Needs (5/1/2025)    Received from [facility] and Its Subsidiaries and Affiliates    PRAPARE - Transportation     Lack of Transportation (Medical): No     Lack of Transportation (Non-Medical): No   Recent Concern: Transportation Needs - High Risk (4/1/2025)    Received from [facility] SDOH Screening     Has lack of transportation kept you from medical appointments, meetings, work or from getting things needed for daily living?: Yes, it has kept me from non-medical meetings, appointments, work or from getting things that i need   Stress: No Stress Concern Present (4/29/2025)    Received from [facility] and Its Subsidiaries and Affiliates    [employer] - Occupational Stress Questionnaire     Feeling of Stress : Not at all   Housing Stability: Low Risk  (5/1/2025)    Received from [facility] and Its Subsidiaries and Affiliates    Housing Stability Vital Sign     Unable to Pay for Housing in the Last Year: No     Number of Times Moved in the Last Year: 0     Homeless in the Last Year: No

## 2025-07-02 ENCOUNTER — PATIENT MESSAGE (OUTPATIENT)
Dept: SURGICAL ONCOLOGY | Facility: CLINIC | Age: 35
End: 2025-07-02
Payer: MEDICARE

## 2025-07-07 ENCOUNTER — TELEPHONE (OUTPATIENT)
Dept: SURGICAL ONCOLOGY | Facility: CLINIC | Age: 35
End: 2025-07-07
Payer: MEDICARE

## 2025-07-07 NOTE — TELEPHONE ENCOUNTER
Spoke with patients mom, Leslie, this morning. Informed patient that [facility] is also out of network with their insurance. Discussed that [facility] in [place] is an option as they do not wish to go back to [facility]. I spoke with Dr. Oro and placed a referral for [facility]. She verbalized understanding.